# Patient Record
Sex: FEMALE | URBAN - METROPOLITAN AREA
[De-identification: names, ages, dates, MRNs, and addresses within clinical notes are randomized per-mention and may not be internally consistent; named-entity substitution may affect disease eponyms.]

---

## 2020-06-01 ENCOUNTER — VIRTUAL VISIT (OUTPATIENT)
Dept: FAMILY MEDICINE CLINIC | Age: 70
End: 2020-06-01

## 2020-06-01 RX ORDER — APIXABAN 5 MG/1
TABLET, FILM COATED ORAL
COMMUNITY
Start: 2020-03-17

## 2020-06-01 RX ORDER — CARVEDILOL 12.5 MG/1
TABLET ORAL
COMMUNITY
Start: 2020-03-17

## 2020-06-01 RX ORDER — LISINOPRIL AND HYDROCHLOROTHIAZIDE 10; 12.5 MG/1; MG/1
1 TABLET ORAL DAILY
COMMUNITY
Start: 2020-02-12

## 2020-06-01 NOTE — PROGRESS NOTES
HISTORY OF PRESENT ILLNESS  Claude Dominguez is a 69 y.o. female. HPI    ROS    Physical Exam    ASSESSMENT and PLAN    ICD-10-CM ICD-9-CM    1.  ERRONEOUS ENCOUNTER--DISREGARD  35804